# Patient Record
Sex: MALE | Race: WHITE | URBAN - METROPOLITAN AREA
[De-identification: names, ages, dates, MRNs, and addresses within clinical notes are randomized per-mention and may not be internally consistent; named-entity substitution may affect disease eponyms.]

---

## 2017-10-12 ENCOUNTER — APPOINTMENT (OUTPATIENT)
Dept: URBAN - METROPOLITAN AREA SURGERY 6 | Age: 82
Setting detail: DERMATOLOGY
End: 2017-10-19

## 2017-10-12 DIAGNOSIS — L82.1 OTHER SEBORRHEIC KERATOSIS: ICD-10-CM

## 2017-10-12 PROBLEM — C43.39 MALIGNANT MELANOMA OF OTHER PARTS OF FACE: Status: ACTIVE | Noted: 2017-10-12

## 2017-10-12 PROBLEM — J45.909 UNSPECIFIED ASTHMA, UNCOMPLICATED: Status: ACTIVE | Noted: 2017-10-12

## 2017-10-12 PROBLEM — Z85.820 PERSONAL HISTORY OF MALIGNANT MELANOMA OF SKIN: Status: ACTIVE | Noted: 2017-10-12

## 2017-10-12 PROCEDURE — 11100: CPT

## 2017-10-12 PROCEDURE — 99202 OFFICE O/P NEW SF 15 MIN: CPT | Mod: 25

## 2017-10-12 PROCEDURE — OTHER PATHOLOGY BILLING: OTHER

## 2017-10-12 PROCEDURE — 88305 TISSUE EXAM BY PATHOLOGIST: CPT | Mod: TC

## 2017-10-12 PROCEDURE — OTHER COUNSELING: OTHER

## 2017-10-12 PROCEDURE — OTHER BIOPSY BY SHAVE METHOD: OTHER

## 2017-10-12 PROCEDURE — OTHER MIPS QUALITY: OTHER

## 2017-10-12 PROCEDURE — OTHER REASSURANCE: OTHER

## 2017-10-12 ASSESSMENT — LOCATION DETAILED DESCRIPTION DERM
LOCATION DETAILED: LEFT TRAGUS
LOCATION DETAILED: RIGHT SUPERIOR MEDIAL UPPER BACK

## 2017-10-12 ASSESSMENT — LOCATION SIMPLE DESCRIPTION DERM
LOCATION SIMPLE: LEFT EAR
LOCATION SIMPLE: RIGHT UPPER BACK

## 2017-10-12 ASSESSMENT — LOCATION ZONE DERM
LOCATION ZONE: TRUNK
LOCATION ZONE: EAR

## 2017-10-12 NOTE — PROCEDURE: BIOPSY BY SHAVE METHOD
Likely 2/2 dehydration, now resolved  -BUN elevated 19 today, was 28 (19-20 baseline) X Size Of Lesion In Cm: 1.8